# Patient Record
Sex: FEMALE | Race: WHITE | NOT HISPANIC OR LATINO | URBAN - METROPOLITAN AREA
[De-identification: names, ages, dates, MRNs, and addresses within clinical notes are randomized per-mention and may not be internally consistent; named-entity substitution may affect disease eponyms.]

---

## 2017-05-16 ENCOUNTER — EMERGENCY (EMERGENCY)
Facility: HOSPITAL | Age: 57
LOS: 1 days | Discharge: LEFT BEFORE DISCHARGE | End: 2017-05-16
Admitting: EMERGENCY MEDICINE

## 2017-05-16 VITALS
SYSTOLIC BLOOD PRESSURE: 136 MMHG | TEMPERATURE: 98 F | OXYGEN SATURATION: 98 % | DIASTOLIC BLOOD PRESSURE: 83 MMHG | RESPIRATION RATE: 20 BRPM | HEART RATE: 81 BPM

## 2017-05-16 DIAGNOSIS — R11.0 NAUSEA: ICD-10-CM

## 2017-05-16 DIAGNOSIS — Z90.12 ACQUIRED ABSENCE OF LEFT BREAST AND NIPPLE: Chronic | ICD-10-CM

## 2017-05-16 NOTE — ED ADULT NURSE NOTE - OBJECTIVE STATEMENT
57 y/o female presenting to the ED with nausea x today; Patient states "had breast mri and started feeling dizzy and nausea after"; Patient states dizziness had resolved; Patient denies vomiting, diarrhea, chest pain, SOB, fevers; steady gait noted upon walking in; a&ox3; bilateral strength and sensation noted; safety and comfort measures provided;  at bedside

## 2017-08-09 ENCOUNTER — APPOINTMENT (OUTPATIENT)
Dept: ENDOCRINOLOGY | Facility: CLINIC | Age: 57
End: 2017-08-09
Payer: COMMERCIAL

## 2017-08-09 VITALS — HEART RATE: 80 BPM | OXYGEN SATURATION: 98 % | SYSTOLIC BLOOD PRESSURE: 102 MMHG | DIASTOLIC BLOOD PRESSURE: 82 MMHG

## 2017-08-09 VITALS — WEIGHT: 134 LBS | HEIGHT: 64 IN | BODY MASS INDEX: 22.88 KG/M2

## 2017-08-09 PROCEDURE — ZZZZZ: CPT

## 2017-08-09 PROCEDURE — 77080 DXA BONE DENSITY AXIAL: CPT

## 2017-08-09 PROCEDURE — 99214 OFFICE O/P EST MOD 30 MIN: CPT | Mod: 25

## 2017-11-10 ENCOUNTER — RESULT REVIEW (OUTPATIENT)
Age: 57
End: 2017-11-10

## 2018-01-09 ENCOUNTER — APPOINTMENT (OUTPATIENT)
Dept: ENDOCRINOLOGY | Facility: CLINIC | Age: 58
End: 2018-01-09
Payer: COMMERCIAL

## 2018-01-09 VITALS
WEIGHT: 144 LBS | SYSTOLIC BLOOD PRESSURE: 112 MMHG | BODY MASS INDEX: 24.59 KG/M2 | HEART RATE: 84 BPM | OXYGEN SATURATION: 98 % | HEIGHT: 64 IN | DIASTOLIC BLOOD PRESSURE: 70 MMHG

## 2018-01-09 DIAGNOSIS — Z87.39 PERSONAL HISTORY OF OTHER DISEASES OF THE MUSCULOSKELETAL SYSTEM AND CONNECTIVE TISSUE: ICD-10-CM

## 2018-01-09 PROCEDURE — 99214 OFFICE O/P EST MOD 30 MIN: CPT

## 2018-01-10 ENCOUNTER — RX RENEWAL (OUTPATIENT)
Age: 58
End: 2018-01-10

## 2018-02-05 ENCOUNTER — APPOINTMENT (OUTPATIENT)
Dept: PLASTIC SURGERY | Facility: CLINIC | Age: 58
End: 2018-02-05
Payer: COMMERCIAL

## 2018-02-05 VITALS
DIASTOLIC BLOOD PRESSURE: 75 MMHG | WEIGHT: 135 LBS | TEMPERATURE: 97.7 F | BODY MASS INDEX: 23.05 KG/M2 | SYSTOLIC BLOOD PRESSURE: 112 MMHG | HEART RATE: 84 BPM | HEIGHT: 64 IN

## 2018-02-05 DIAGNOSIS — Z82.49 FAMILY HISTORY OF ISCHEMIC HEART DISEASE AND OTHER DISEASES OF THE CIRCULATORY SYSTEM: ICD-10-CM

## 2018-02-05 DIAGNOSIS — F15.90 OTHER STIMULANT USE, UNSPECIFIED, UNCOMPLICATED: ICD-10-CM

## 2018-02-05 DIAGNOSIS — Z78.9 OTHER SPECIFIED HEALTH STATUS: ICD-10-CM

## 2018-02-05 DIAGNOSIS — C44.91 BASAL CELL CARCINOMA OF SKIN, UNSPECIFIED: ICD-10-CM

## 2018-02-05 DIAGNOSIS — Z80.8 FAMILY HISTORY OF MALIGNANT NEOPLASM OF OTHER ORGANS OR SYSTEMS: ICD-10-CM

## 2018-02-05 PROCEDURE — 99203 OFFICE O/P NEW LOW 30 MIN: CPT

## 2018-08-14 ENCOUNTER — APPOINTMENT (OUTPATIENT)
Dept: ENDOCRINOLOGY | Facility: CLINIC | Age: 58
End: 2018-08-14
Payer: COMMERCIAL

## 2018-08-14 VITALS — WEIGHT: 133 LBS | HEIGHT: 63.9 IN | BODY MASS INDEX: 22.99 KG/M2

## 2018-08-14 VITALS — SYSTOLIC BLOOD PRESSURE: 120 MMHG | HEART RATE: 70 BPM | DIASTOLIC BLOOD PRESSURE: 80 MMHG | OXYGEN SATURATION: 97 %

## 2018-08-14 PROBLEM — Q76.1 KLIPPEL-FEIL SYNDROME: Chronic | Status: ACTIVE | Noted: 2017-05-16

## 2018-08-14 PROBLEM — D05.10 INTRADUCTAL CARCINOMA IN SITU OF UNSPECIFIED BREAST: Chronic | Status: ACTIVE | Noted: 2017-05-16

## 2018-08-14 PROCEDURE — 77080 DXA BONE DENSITY AXIAL: CPT

## 2018-08-14 PROCEDURE — 99214 OFFICE O/P EST MOD 30 MIN: CPT | Mod: 25

## 2018-08-14 PROCEDURE — ZZZZZ: CPT

## 2018-08-20 ENCOUNTER — MEDICATION RENEWAL (OUTPATIENT)
Age: 58
End: 2018-08-20

## 2018-08-20 RX ORDER — DENOSUMAB 60 MG/ML
60 INJECTION SUBCUTANEOUS
Qty: 1 | Refills: 1 | Status: ACTIVE | COMMUNITY
Start: 2018-08-20

## 2018-11-07 ENCOUNTER — APPOINTMENT (OUTPATIENT)
Dept: ENDOCRINOLOGY | Facility: CLINIC | Age: 58
End: 2018-11-07

## 2018-11-14 ENCOUNTER — RESULT REVIEW (OUTPATIENT)
Age: 58
End: 2018-11-14

## 2019-01-23 ENCOUNTER — RX RENEWAL (OUTPATIENT)
Age: 59
End: 2019-01-23

## 2019-01-23 RX ORDER — ALENDRONATE SODIUM 70 MG/1
70 TABLET ORAL
Qty: 13 | Refills: 1 | Status: ACTIVE | COMMUNITY
Start: 2017-08-09 | End: 1900-01-01

## 2019-11-19 ENCOUNTER — RESULT REVIEW (OUTPATIENT)
Age: 59
End: 2019-11-19

## 2020-09-22 ENCOUNTER — TRANSCRIPTION ENCOUNTER (OUTPATIENT)
Age: 60
End: 2020-09-22

## 2020-11-22 ENCOUNTER — RESULT REVIEW (OUTPATIENT)
Age: 60
End: 2020-11-22

## 2021-02-02 ENCOUNTER — TRANSCRIPTION ENCOUNTER (OUTPATIENT)
Age: 61
End: 2021-02-02

## 2021-03-23 ENCOUNTER — TRANSCRIPTION ENCOUNTER (OUTPATIENT)
Age: 61
End: 2021-03-23

## 2021-11-30 ENCOUNTER — RESULT REVIEW (OUTPATIENT)
Age: 61
End: 2021-11-30

## 2022-06-27 ENCOUNTER — APPOINTMENT (OUTPATIENT)
Dept: ORTHOPEDIC SURGERY | Facility: CLINIC | Age: 62
End: 2022-06-27
Payer: COMMERCIAL

## 2022-06-27 VITALS — BODY MASS INDEX: 21.85 KG/M2 | WEIGHT: 128 LBS | HEIGHT: 64 IN

## 2022-06-27 DIAGNOSIS — M54.2 CERVICALGIA: ICD-10-CM

## 2022-06-27 DIAGNOSIS — M54.12 RADICULOPATHY, CERVICAL REGION: ICD-10-CM

## 2022-06-27 PROCEDURE — 99204 OFFICE O/P NEW MOD 45 MIN: CPT

## 2022-06-27 NOTE — HISTORY OF PRESENT ILLNESS
[Stable] : stable [de-identified] : Patient is a 61 year-old female who presents to the office today for initial evaluation of neck and back pain. The lower back pain has been present for many years. She notes that she had bought a new mattress about 1 month ago and had an exacerbation of her pain. The pain is intermittent in nature but worsening in nature. Symptoms radiate down both legs to just proximal to the knees.\par Her neck pain is mostly radiating into the left upper trap. It is sharp and burning in nature and is more constant in nature. She does complain of paresthesias in both hands, right side is in the last 2 digits and left side is in the 1st webspace.\par In the past, she has tried PT, acupuncture and Chiropractic care, all of which were not helpful.\par Saw Dr. Strong. \par CARMEN prior to 2009 which was unhelpful, trigger injections were also unhelpful, LESI prior to 2009, not helpful. \montse Currently sees a pain management physician (Dr. Jero Delcid).\par Has gotten a new mattress, which worsened pain.\par PSHx cervical fusion.\par Claims to be allergic to anti-inflammatories. \par No fever, chills, sweats, nausea or vomiting. No bowel or bladder dysfunction, no recent weight loss or gain, no night pain. This history is in addition to the intake form that I personally reviewed.\par \par

## 2022-06-27 NOTE — DISCUSSION/SUMMARY
[de-identified] : S/P cervical surgery.\par Cervical and lumbar degenerative disc disease. \par Discussed all options.\par Referral for physical therapy. \par Cervical and Lumbar HEP.\par Will obtain an MRI of back and neck-pateint already ordered from outside physician.\par F/U after MRIs.\par Taking care of ill . \par All options discussed including rest, medicine, home exercise, acupuncture, Chiropractic care, Physical Therapy, Pain management, and last resort surgery. All questions were answered, all alternatives discussed and the patient is in complete agreement with that plan. Follow-up appointment as instructed. Any issues and the patient will call or come in sooner.\par

## 2022-06-27 NOTE — ADDENDUM
[FreeTextEntry1] : This note was written by Ruben Kay on 06/27/2022 acting as scribe for Dr. Jamie Currie M.D.\par \par I, Jamie Currie MD, have read and attest that all the information, medical decision making and discharge instructions within are true and accurate.

## 2022-06-27 NOTE — PHYSICAL EXAM
[Normal] : Gait: normal [Anderson's Sign] : negative Anderson's sign [Pronator Drift] : negative pronator drift [SLR] : negative straight leg raise [de-identified] : 5 out of 5 motor strength, sensation is intact and symmetrical full range of motion flexion extension and rotation, no palpatory tenderness full range of motion of hips knees shoulders and elbows (all four extremities), no atrophy, negative straight leg raise, no pathological reflexes, no swelling, normal ambulation, no apparent distress skin is intact, no palpable lymph nodes, no upper or lower extremity instability, alert and oriented x3 and normal mood. Normal finger-to nose test.  [de-identified] : MRI lumbar 2019-Mild degenerative changes.

## 2022-08-01 ENCOUNTER — APPOINTMENT (OUTPATIENT)
Dept: ORTHOPEDIC SURGERY | Facility: CLINIC | Age: 62
End: 2022-08-01

## 2022-08-01 VITALS — BODY MASS INDEX: 21.85 KG/M2 | WEIGHT: 128 LBS | HEIGHT: 64 IN

## 2022-08-01 DIAGNOSIS — M51.36 OTHER INTERVERTEBRAL DISC DEGENERATION, LUMBAR REGION: ICD-10-CM

## 2022-08-01 DIAGNOSIS — M47.812 SPONDYLOSIS W/OUT MYELOPATHY OR RADICULOPATHY, CERVICAL REGION: ICD-10-CM

## 2022-08-01 PROCEDURE — 99214 OFFICE O/P EST MOD 30 MIN: CPT

## 2022-08-01 NOTE — DISCUSSION/SUMMARY
[de-identified] : L5-S1 degeneration.\par Cervical degenerative disc disease.\par Discussed all options. \par Continue PT.\par F/U PRN.\par All options discussed including rest, medicine, home exercise, acupuncture, Chiropractic care, Physical Therapy, Pain management, and last resort surgery. All questions were answered, all alternatives discussed and the patient is in complete agreement with that plan. Follow-up appointment as instructed. Any issues and the patient will call or come in sooner.

## 2022-08-01 NOTE — ADDENDUM
[FreeTextEntry1] : This note was written by Ruben Kay on 08/01/2022 acting as scribe for Dr. Jamie Currie M.D.\par \par I, Jamie Currie MD, have read and attest that all the information, medical decision making and discharge instructions within are true and accurate.

## 2022-08-01 NOTE — HISTORY OF PRESENT ILLNESS
[Stable] : stable [de-identified] : Patient is here for MRI cervical and lumbar review.\par Feeling better.\par Performing PT.\par No leg or arm pain today.\par No fever chills sweats nausea vomiting no bowel or bladder dysfunction, no recent weight loss or gain no night pain. This history is in addition to the intake form that I personally reviewed.

## 2022-08-01 NOTE — PHYSICAL EXAM
[Normal] : Gait: normal [Anderson's Sign] : negative Anderson's sign [Pronator Drift] : negative pronator drift [SLR] : negative straight leg raise [de-identified] : 5 out of 5 motor strength, sensation is intact and symmetrical full range of motion flexion extension and rotation, no palpatory tenderness full range of motion of hips knees shoulders and elbows (all four extremities), no atrophy, negative straight leg raise, no pathological reflexes, no swelling, normal ambulation, no apparent distress skin is intact, no palpable lymph nodes, no upper or lower extremity instability, alert and oriented x3 and normal mood. Normal finger-to nose test. \par Limited neck ROM [de-identified] : MRI standup \par 07/28/2022\par Lumbar disc degenerative disease \par Cervical degenerative disc disease-prior C5-7 ACDF.\par No severe neural compression-reviewed with patient\par

## 2022-12-09 ENCOUNTER — APPOINTMENT (OUTPATIENT)
Dept: OBGYN | Facility: CLINIC | Age: 62
End: 2022-12-09

## 2022-12-09 PROCEDURE — 81002 URINALYSIS NONAUTO W/O SCOPE: CPT

## 2022-12-09 PROCEDURE — 82270 OCCULT BLOOD FECES: CPT

## 2022-12-09 PROCEDURE — 99396 PREV VISIT EST AGE 40-64: CPT

## 2022-12-12 ENCOUNTER — APPOINTMENT (OUTPATIENT)
Dept: ORTHOPEDIC SURGERY | Facility: CLINIC | Age: 62
End: 2022-12-12

## 2022-12-12 VITALS
HEIGHT: 64 IN | WEIGHT: 128 LBS | BODY MASS INDEX: 21.85 KG/M2 | DIASTOLIC BLOOD PRESSURE: 76 MMHG | SYSTOLIC BLOOD PRESSURE: 115 MMHG

## 2022-12-12 DIAGNOSIS — M81.0 AGE-RELATED OSTEOPOROSIS W/OUT CURRENT PATHOLOGICAL FRACTURE: ICD-10-CM

## 2022-12-12 DIAGNOSIS — M47.817 SPONDYLOSIS W/OUT MYELOPATHY OR RADICULOPATHY, LUMBOSACRAL REGION: ICD-10-CM

## 2022-12-12 PROCEDURE — 99214 OFFICE O/P EST MOD 30 MIN: CPT

## 2022-12-12 NOTE — HISTORY OF PRESENT ILLNESS
[de-identified] : This is a 62 year old female that is here today for evaluation of her neck symptoms. She states she had neck surgery in 2009. Patient says she has osteoporosis and she is interested in a physical therapy prescription for treatment.. She denies any issues with hand dexterity or balance. She denies any bowel or bladder concerns or saddle anesthesia.\par

## 2022-12-12 NOTE — ASSESSMENT
[FreeTextEntry1] : I had a lengthy discussion with the patient in regards to treatment plan and diagnosis. There are no red flag findings on imaging nor are there any red flag findings on clinical exam. Therefore we will proceed with a course of conservative treatment. This would include physical therapy/ home exercise program, Tylenol, NSAIDS as  medically indicated. The patient will follow-up with me in approximately 6 weeks. I encouraged the patient to follow-up sooner if there are any new or worsening symptoms.\par

## 2022-12-12 NOTE — PHYSICAL EXAM
[de-identified] : Cervical Physical Exam\par \par Gait -Normal\par \par Station -Normal\par \par Sagittal balance -Normal\par \par Compensatory mechanism? - None\par \par Horizontal gaze - Maintained\par \par Heel walk -Normal\par \par Toe walk - Normal\par \par Reflexes\par Biceps - Normal\par Triceps- Normal\par Brachioradialis - Normal\par Patellar - Normal\par Gastroc - Normal\par Clonus - No\par Anderson's - None\par Shoulder Exam - within normal limits\par Spruling's - neg \par Wrist Pulses - 2+ radial/ulnar\par Foot Pulses - 2+  DP/PT\par Cervical range of motion - Normal\par Sensation\par C5 - T1 sensation intact to light touch bilaterally\par L1 - S1 sensation intact to light touch bilaterally\par \par Motor\par   \par \par           Deltoid    Biceps   Triceps   WF   WE    IO    \par Right  5/5 5/5 5/5 5/5 5/5 5/5 5/5\par Left    5/5 5/5 5/5 5/5 5/5 5/5 5/5\par \par \par \par             IP         Quad      HS      TA    Gastoc     EHL\par Right    5/5 5/5/         5/5 5/5 5/5 5/5\par Left      5/5 5/5 5/5 5/5 5/5 5/5\par \par  [de-identified] : Lumbar radiographs\par No stability noted\par Facet arthropathy noted

## 2023-02-22 DIAGNOSIS — M54.9 DORSALGIA, UNSPECIFIED: ICD-10-CM

## 2023-07-24 ENCOUNTER — NON-APPOINTMENT (OUTPATIENT)
Age: 63
End: 2023-07-24

## 2023-07-24 ENCOUNTER — APPOINTMENT (OUTPATIENT)
Dept: ALLERGY | Facility: CLINIC | Age: 63
End: 2023-07-24
Payer: COMMERCIAL

## 2023-07-24 VITALS
HEART RATE: 68 BPM | HEIGHT: 64 IN | DIASTOLIC BLOOD PRESSURE: 60 MMHG | SYSTOLIC BLOOD PRESSURE: 126 MMHG | WEIGHT: 128 LBS | TEMPERATURE: 98.6 F | OXYGEN SATURATION: 98 % | RESPIRATION RATE: 14 BRPM | BODY MASS INDEX: 21.85 KG/M2

## 2023-07-24 PROCEDURE — 99203 OFFICE O/P NEW LOW 30 MIN: CPT

## 2023-07-24 NOTE — PHYSICAL EXAM
[Alert] : alert [Well Nourished] : well nourished [Healthy Appearance] : healthy appearance [No Acute Distress] : no acute distress [Well Developed] : well developed [Normal Voice/Communication] : normal voice communication [No Neck Mass] : no neck mass was observed [No LAD] : no lymphadenopathy [Normal Rate and Effort] : normal respiratory rhythm and effort [No Crackles] : no crackles [No Retractions] : no retractions [Bilateral Audible Breath Sounds] : bilateral audible breath sounds [Wheezing] : no wheezing was heard [Normal Rate] : heart rate was normal  [Normal S1, S2] : normal S1 and S2 [Regular Rhythm] : with a regular rhythm [Normal Cervical Lymph Nodes] : cervical [Skin Intact] : skin intact  [No Rash] : no rash [Normal Mood] : mood was normal [Normal Affect] : affect was normal [Judgment and Insight Age Appropriate] : judgement and insight is age appropriate [Alert, Awake, Oriented as Age-Appropriate] : alert, awake, oriented as age appropriate

## 2023-07-24 NOTE — SOCIAL HISTORY
[Apartment] : [unfilled] lives in an apartment  [] :  [FreeTextEntry1] : Lives with spouse\par TA  [Smokers in Household] : there are no smokers in the home

## 2023-07-24 NOTE — ASSESSMENT
[FreeTextEntry1] : Multiple antibiotic reactions:\par \par RV skin testing to Penicillin and cephalosporin antibiotics.   \par \par NSAID:  GI intolerance - she will continue to avoid

## 2023-07-24 NOTE — HISTORY OF PRESENT ILLNESS
[de-identified] : Patient with multiple antibiotic and drug allergies:\par \par Penicillin:  ? age - itchy - she had amoxicillin in her 50s and had some itching with no rash\par \par Sulfa: age 62 - rash all over back - stopped medication and rash improved \par \par Azithromycin: age 50s - itchy \par \par Cefuroxime: 3/23 - lips turned red - itchy - red rash - 30 minutes - tolerated cefpodoxime with no reaction.  \par \par NSAID :  stomach and chest pain \par \par Now desires to know if she is still allergic.

## 2023-08-16 ENCOUNTER — APPOINTMENT (OUTPATIENT)
Dept: ALLERGY | Facility: CLINIC | Age: 63
End: 2023-08-16

## 2023-12-13 ENCOUNTER — APPOINTMENT (OUTPATIENT)
Dept: OBGYN | Facility: CLINIC | Age: 63
End: 2023-12-13
Payer: COMMERCIAL

## 2023-12-13 PROCEDURE — 82270 OCCULT BLOOD FECES: CPT

## 2023-12-13 PROCEDURE — 99396 PREV VISIT EST AGE 40-64: CPT

## 2023-12-13 PROCEDURE — 81002 URINALYSIS NONAUTO W/O SCOPE: CPT

## 2024-12-17 ENCOUNTER — APPOINTMENT (OUTPATIENT)
Dept: OBGYN | Facility: CLINIC | Age: 64
End: 2024-12-17

## 2024-12-17 PROCEDURE — 99459 PELVIC EXAMINATION: CPT | Mod: NC

## 2024-12-17 PROCEDURE — 81002 URINALYSIS NONAUTO W/O SCOPE: CPT

## 2024-12-17 PROCEDURE — 99396 PREV VISIT EST AGE 40-64: CPT

## 2025-02-07 ENCOUNTER — NON-APPOINTMENT (OUTPATIENT)
Age: 65
End: 2025-02-07

## 2025-02-07 ENCOUNTER — APPOINTMENT (OUTPATIENT)
Dept: OPHTHALMOLOGY | Facility: CLINIC | Age: 65
End: 2025-02-07
Payer: COMMERCIAL

## 2025-02-07 PROCEDURE — 92002 INTRM OPH EXAM NEW PATIENT: CPT

## 2025-02-19 ENCOUNTER — APPOINTMENT (OUTPATIENT)
Dept: VASCULAR SURGERY | Facility: CLINIC | Age: 65
End: 2025-02-19
Payer: COMMERCIAL

## 2025-02-19 ENCOUNTER — NON-APPOINTMENT (OUTPATIENT)
Age: 65
End: 2025-02-19

## 2025-02-19 DIAGNOSIS — I87.2 VENOUS INSUFFICIENCY (CHRONIC) (PERIPHERAL): ICD-10-CM

## 2025-02-19 DIAGNOSIS — I78.1 NEVUS, NON-NEOPLASTIC: ICD-10-CM

## 2025-02-19 DIAGNOSIS — Z87.891 PERSONAL HISTORY OF NICOTINE DEPENDENCE: ICD-10-CM

## 2025-02-19 DIAGNOSIS — I83.93 ASYMPTOMATIC VARICOSE VEINS OF BILATERAL LOWER EXTREMITIES: ICD-10-CM

## 2025-02-19 PROCEDURE — 93970 EXTREMITY STUDY: CPT

## 2025-02-19 PROCEDURE — 99203 OFFICE O/P NEW LOW 30 MIN: CPT
